# Patient Record
Sex: FEMALE | Race: WHITE | Employment: UNEMPLOYED | URBAN - METROPOLITAN AREA
[De-identification: names, ages, dates, MRNs, and addresses within clinical notes are randomized per-mention and may not be internally consistent; named-entity substitution may affect disease eponyms.]

---

## 2017-05-29 ASSESSMENT — ENCOUNTER SYMPTOMS
SINUS CONGESTION: 0
DOUBLE VISION: 0
HEMATURIA: 0
SINUS PAIN: 0
CHILLS: 0
EYE REDNESS: 0
TROUBLE SWALLOWING: 0
SORE THROAT: 0
NIGHT SWEATS: 1
FATIGUE: 1
POLYDIPSIA: 0
HOT FLASHES: 1
JAUNDICE: 0
DIARRHEA: 1
CONSTIPATION: 0
NECK MASS: 0
HOARSE VOICE: 0
INCREASED ENERGY: 1
DECREASED APPETITE: 0
ABDOMINAL PAIN: 0
VOMITING: 0
RECTAL PAIN: 0
RECTAL BLEEDING: 0
WEIGHT GAIN: 1
HEARTBURN: 0
ALTERED TEMPERATURE REGULATION: 0
DIFFICULTY URINATING: 0
BLOOD IN STOOL: 0
HALLUCINATIONS: 0
FLANK PAIN: 0
EYE PAIN: 0
NAUSEA: 0
POLYPHAGIA: 0
TASTE DISTURBANCE: 0
EYE IRRITATION: 0
FEVER: 0
EYE WATERING: 0
DYSURIA: 0
DECREASED LIBIDO: 1
BOWEL INCONTINENCE: 0
BLOATING: 1
SMELL DISTURBANCE: 0
WEIGHT LOSS: 0

## 2017-05-29 ASSESSMENT — ANXIETY QUESTIONNAIRES
3. WORRYING TOO MUCH ABOUT DIFFERENT THINGS: NOT AT ALL
2. NOT BEING ABLE TO STOP OR CONTROL WORRYING: NOT AT ALL
GAD7 TOTAL SCORE: 0
GAD7 TOTAL SCORE: 0
7. FEELING AFRAID AS IF SOMETHING AWFUL MIGHT HAPPEN: NOT AT ALL
6. BECOMING EASILY ANNOYED OR IRRITABLE: SEVERAL DAYS
1. FEELING NERVOUS, ANXIOUS, OR ON EDGE: NOT AT ALL
4. TROUBLE RELAXING: NOT AT ALL
GAD7 TOTAL SCORE: 1
5. BEING SO RESTLESS THAT IT IS HARD TO SIT STILL: NOT AT ALL
7. FEELING AFRAID AS IF SOMETHING AWFUL MIGHT HAPPEN: NOT AT ALL

## 2017-06-07 ENCOUNTER — OFFICE VISIT (OUTPATIENT)
Dept: OBGYN | Facility: CLINIC | Age: 52
End: 2017-06-07
Attending: OBSTETRICS & GYNECOLOGY
Payer: COMMERCIAL

## 2017-06-07 VITALS
SYSTOLIC BLOOD PRESSURE: 142 MMHG | DIASTOLIC BLOOD PRESSURE: 89 MMHG | HEART RATE: 88 BPM | WEIGHT: 207.3 LBS | HEIGHT: 66 IN | BODY MASS INDEX: 33.32 KG/M2

## 2017-06-07 DIAGNOSIS — N92.0 EXCESSIVE OR FREQUENT MENSTRUATION: ICD-10-CM

## 2017-06-07 DIAGNOSIS — Z01.411 ENCOUNTER FOR GYNECOLOGICAL EXAMINATION WITH ABNORMAL FINDING: Primary | ICD-10-CM

## 2017-06-07 DIAGNOSIS — N84.1 CERVICAL POLYP: ICD-10-CM

## 2017-06-07 DIAGNOSIS — Z12.4 SCREENING FOR MALIGNANT NEOPLASM OF CERVIX: ICD-10-CM

## 2017-06-07 PROCEDURE — 99212 OFFICE O/P EST SF 10 MIN: CPT | Mod: 25,ZF

## 2017-06-07 PROCEDURE — 87624 HPV HI-RISK TYP POOLED RSLT: CPT | Performed by: OBSTETRICS & GYNECOLOGY

## 2017-06-07 PROCEDURE — 88305 TISSUE EXAM BY PATHOLOGIST: CPT | Performed by: OBSTETRICS & GYNECOLOGY

## 2017-06-07 PROCEDURE — G0145 SCR C/V CYTO,THINLAYER,RESCR: HCPCS | Performed by: OBSTETRICS & GYNECOLOGY

## 2017-06-07 RX ORDER — LOSARTAN POTASSIUM 100 MG/1
100 TABLET ORAL
COMMUNITY
Start: 2016-06-09

## 2017-06-07 NOTE — LETTER
2017     RE: Alicia Machuca  [address]  [facility] [address]     Dear Colleague,    Thank you for referring your patient, Alicia Machuca, to the [facility] at [facility]. Please see a copy of my visit note below.  Progress Note    SUBJECTIVE:  Alicia Machuca is an 51 year old  , who requests a breast and pelvic exam.    Patient is followed by Dr. Thomas for primary care.    Concerns today include: Continued heavy menstrual bleeding. Has been stable for past 1-2 years, but heavy bleeding, first day changing pads every 2 hours with clots. Lasts 4 days in total. Previously had abnormal endometrial cells on pap and had D&C which was benign. Does have some perimenopausal symptoms which are maneagable. Does feel that weight has become more of an issue. Decreased exercise and has 8lb weight gain. Occasional stress urinary incontinence, worse with illness. Sexually active without issues.     Menstrual History:  Menstrual History 2011 10/3/2014 10/21/2014 2017 2017   LAST MENSTRUAL PERIOD 2011 2014 10/16/2014 2017 -   Menarche age - - - - 11   Period Cycle (Days) - - - - Q month   Period Duration (Days) - - - - varies 2-5 days   Menstrual Flow - - - - Moderate   Dysmenorrhea - - - - Mild   PMS Symptoms - - - - Headache   Reviewed Today - - - - Yes       Last    Lab Results   Component Value Date    PAP ATYP 10/03/2014       Mammogram current: Due this summer, patient will schedule.     HISTORY:  Prescription Medications as of 2017             losartan (COZAAR) 100 MG tablet Take 100 mg by mouth    Multiple Vitamins-Minerals (MULTIPLE VITAMINS/WOMENS PO) Take 1 tablet by mouth daily    ibuprofen (ADVIL,MOTRIN) 600 MG tablet Take 1 tablet (600 mg) by mouth every 6 hours as needed for pain (mild)        Allergies   Allergen Reactions     Perfume        There is no immunization history on file for this  "patient.    Obstetric History       T2      L2     SAB0   TAB0   Ectopic0   Multiple0   Live Births0      Past Medical History:   Diagnosis Date     Hypertension      Knee pain, bilateral     no surgery, PT, exercises     Lipidemia     Hx elevated in past,   nl FLP,      Past Surgical History:   Procedure Laterality Date     DILATION AND CURETTAGE N/A 2014    Procedure: DILATION AND CURETTAGE;  Surgeon: Anusha Bolden MD;  Location: [facility]     Family History   Problem Relation Age of Onset     Hypertension Mother      OSTEOPOROSIS Mother      Asthma Mother      Hypertension Father      DIABETES Father      Obesity Father      Neurologic Disorder Son      dx aspergers     Asthma Brother      Social History     Social History     Marital status:      Spouse name: N/A     Number of children: N/A     Years of education: N/A     Social History Main Topics     Smoking status: Never Smoker     Smokeless tobacco: Never Used     Alcohol use 2.0 oz/week      Comment: 4-5 per week     Drug use: No     Sexual activity: Yes     Partners: Male     Birth control/ protection: Condom       ROS    EXAM:  Blood pressure 142/89, pulse 88, height 1.676 m (5' 6\"), weight 94 kg (207 lb 4.8 oz), last menstrual period 2017, not currently breastfeeding. Body mass index is 33.46 kg/(m^2).  General appearance: Pleasant female in no acute distress.     BREAST EXAM:  Breast: Without visible skin changes. No dimpling or lesions seen.   Breasts supple, non-tender with palpation, no dominant mass, nodularity, or nipple discharge noted bilaterally. Axillary nodes negative.      PELVIC EXAM:  EG/BUS: Normal genital architecture without lesions, erythema or abnormal secretions Bartholin's, Urethra, Lex[place]'s normal   Urethral meatus: normal   Urethra: no masses, tenderness, or scarring   Bladder: no masses or tenderness   Vagina: moist, pink, rugae with creamy, white and odorless  secretions  Cervix: no " lesions  Uterus: anteverted,   Adnexa: Within normal limits and No masses, nodularity, tenderness  Rectum:anus normal       ASSESSMENT:  Encounter Diagnosis   Name Primary?     Excessive or frequent menstruation Yes      51 year old Female Pelvic and Breast Exam  Menorrhagia    PLAN:   Orders Placed This Encounter   Procedures     US GYN Complete Transvaginal - 32283 (In Clinic)       Will follow up after US for possible EMB  Verbalized understanding and agreement with visit plan.    Anusha Bolden MD         Answers for HPI/ROS submitted by the patient on 5/29/2017   PHQ-2 Score: 0  FAUSTINA 7 TOTAL SCORE: 0  General Symptoms: Yes  Skin Symptoms: No  HENT Symptoms: Yes  EYE SYMPTOMS: Yes  HEART SYMPTOMS: No  LUNG SYMPTOMS: No  INTESTINAL SYMPTOMS: Yes  URINARY SYMPTOMS: Yes  GYNECOLOGIC SYMPTOMS: Yes  BREAST SYMPTOMS: No  SKELETAL SYMPTOMS: No  BLOOD SYMPTOMS: No  NERVOUS SYSTEM SYMPTOMS: No  MENTAL HEALTH SYMPTOMS: No  Fever: No  Loss of appetite: No  Weight loss: No  Weight gain: Yes  Fatigue: Yes  Night sweats: Yes  Chills: No  Increased stress: No  Excessive hunger: No  Excessive thirst: No  Feeling hot or cold when others believe the temperature is normal: No  Loss of height: No  Post-operative complications: No  Surgical site pain: No  Hallucinations: No  Change in or Loss of Energy: Yes  Hyperactivity: No  Confusion: No  Ear pain: No  Ear discharge: No  Hearing loss: No  Tinnitus: No  Nosebleeds: No  Congestion: No  Sinus pain: No  Trouble swallowing: No   Voice hoarseness: No  Mouth sores: No  Sore throat: No  Tooth pain: No  Gum tenderness: No  Bleeding gums: No  Change in taste: No  Change in sense of smell: No  Dry mouth: Yes  Hearing aid used: No  Neck lump: No  Eye pain: No  Vision loss: Yes  Dry eyes: Yes  Watery eyes: No  Eye bulging: No  Double vision: No  Flashing of lights: No  Spots: No  Floaters: No  Redness: No  Crossed eyes: No  Tunnel Vision: No  Yellowing of eyes: No  Eye irritation:  No  Heart burn or indigestion: No  Nausea: No  Vomiting: No  Abdominal pain: No  Bloating: Yes  Constipation: No  Diarrhea: Yes  Blood in stool: No  Black stools: No  Rectal or Anal pain: No  Fecal incontinence: No  Rectal bleeding: No  Yellowing of skin or eyes: No  Vomit with blood: No  Change in stools: No  Hemorrhoids: No  Trouble holding urine or incontinence: No  Pain or burning: No  Trouble starting or stopping: No  Increased frequency of urination: Yes  Blood in urine: No  Decreased frequency of urination: No  Frequent nighttime urination: Yes  Flank pain: No  Difficulty emptying bladder: No  Bleeding or spotting between periods: Yes  Heavy or painful periods: Yes  Irregular periods: Yes  Vaginal discharge: No  Hot flashes: Yes  Vaginal dryness: No  Genital ulcers: No  Reduced libido: Yes  Painful intercourse: No  Difficulty with sexual arousal: No  Post-menopausal bleeding: No    Anusha Bolden MD

## 2017-06-07 NOTE — MR AVS SNAPSHOT
After Visit Summary   6/7/2017    Alicia Machuca    MRN: [id number]           Patient Information     Date Of Birth          1965        Visit Information        Provider Department      6/7/2017 12:45 PM Anusha Bolden MD [facility]        Today's Diagnoses     Excessive or frequent menstruation    -  1      Care Instructions    Schedule ultrasound within 2 weeks of end of period.   Will consider biopsy of uterus following ultrasound.     Check with internist about thyroid testing.     Mammogram this summer.     Follow up for breast/pelvic exam yearly. Pap likely due in 5 years.               Follow-ups after your visit        Future tests that were ordered for you today     Open Future Orders        Priority Expected Expires Ordered    US GYN Complete Transvaginal - 64239 (In Clinic) Routine  10/5/2017 6/7/2017            Who to contact     Please call your clinic at [phone] to:    Ask questions about your health    Make or cancel appointments    Discuss your medicines    Learn about your test results    Speak to your doctor   If you have compliments or concerns about an experience at your clinic, or if you wish to file a complaint, please contact [facility] Patient Relations at [phone] or email us at [email or web address]         Additional Information About Your Visit        MyChart Information     [employer] gives you secure access to your electronic health record. If you see a primary care provider, you can also send messages to your care team and make appointments. If you have questions, please call your primary care clinic.  If you do not have a primary care provider, please call [phone] and they will assist you.      TermScout is an electronic gateway that provides easy, online access to your medical records. With TermScout, you can request a clinic appointment, read your test results, renew a prescription or  "communicate with your care team.     To access your existing account, please contact your [facility] or call [phone] for assistance.        Care EveryWhere ID     This is your Care EveryWhere ID. This could be used by other organizations to access your [place] medical records  [id number]        Your Vitals Were     Pulse Height Last Period Breastfeeding? BMI (Body Mass Index)       88 1.676 m (5' 6\") 05/17/2017 (Approximate) No 33.46 kg/m2        Blood Pressure from Last 3 Encounters:   06/07/17 142/89   11/25/14 134/86   11/12/14 130/79    Weight from Last 3 Encounters:   06/07/17 94 kg (207 lb 4.8 oz)   11/25/14 92.2 kg (203 lb 3.2 oz)   11/12/14 92.1 kg (203 lb 0.7 oz)               Primary Care Provider Office Phone # Fax #    Dominga Thomas [phone]       [facility] [address]  [facility] [address]        Thank you!     Thank you for choosing [facility]  for your care. Our goal is always to provide you with excellent care. Hearing back from our patients is one way we can continue to improve our services. Please take a few minutes to complete the written survey that you may receive in the mail after your visit with us. Thank you!             Your Updated Medication List - Protect others around you: Learn how to safely use, store and throw away your medicines at www.disposemymeds.org.          This list is accurate as of: 6/7/17  1:21 PM.  Always use your most recent med list.                   Brand Name Dispense Instructions for use    ibuprofen 600 MG tablet    ADVIL/MOTRIN    30 tablet    Take 1 tablet (600 mg) by mouth every 6 hours as needed for pain (mild)       losartan 100 MG tablet    COZAAR     Take 100 mg by mouth       MULTIPLE VITAMINS/WOMENS PO      Take 1 tablet by mouth daily         "

## 2017-06-07 NOTE — PROGRESS NOTES
Progress Note    SUBJECTIVE:  Alicia Machuca is an 51 year old  , who requests a breast and pelvic exam.    Patient is followed by Dr. Thomas for primary care.    Concerns today include: Continued heavy menstrual bleeding. Has been stable for past 1-2 years, but heavy bleeding, first day changing pads every 2 hours with clots. Lasts 4 days in total. Previously had abnormal endometrial cells on pap and had D&C which was benign. Does have some perimenopausal symptoms which are maneagable. Does feel that weight has become more of an issue. Decreased exercise and has 8lb weight gain. Occasional stress urinary incontinence, worse with illness. Sexually active without issues.     Menstrual History:  Menstrual History 2011 10/3/2014 10/21/2014 2017 2017   LAST MENSTRUAL PERIOD 2011 2014 10/16/2014 2017 -   Menarche age - - - - 11   Period Cycle (Days) - - - - Q month   Period Duration (Days) - - - - varies 2-5 days   Menstrual Flow - - - - Moderate   Dysmenorrhea - - - - Mild   PMS Symptoms - - - - Headache   Reviewed Today - - - - Yes       Last    Lab Results   Component Value Date    PAP ATYP 10/03/2014       Mammogram current: Due this summer, patient will schedule.     HISTORY:  Prescription Medications as of 2017             losartan (COZAAR) 100 MG tablet Take 100 mg by mouth    Multiple Vitamins-Minerals (MULTIPLE VITAMINS/WOMENS PO) Take 1 tablet by mouth daily    ibuprofen (ADVIL,MOTRIN) 600 MG tablet Take 1 tablet (600 mg) by mouth every 6 hours as needed for pain (mild)        Allergies   Allergen Reactions     Perfume        There is no immunization history on file for this patient.    Obstetric History       T2      L2     SAB0   TAB0   Ectopic0   Multiple0   Live Births0      Past Medical History:   Diagnosis Date     Hypertension      Knee pain, bilateral     no surgery, PT, exercises     Lipidemia     Hx elevated in past,   nl FLP,      Past  "Surgical History:   Procedure Laterality Date     DILATION AND CURETTAGE N/A 11/12/2014    Procedure: DILATION AND CURETTAGE;  Surgeon: Anusha Bolden MD;  Location: [facility]     Family History   Problem Relation Age of Onset     Hypertension Mother      OSTEOPOROSIS Mother      Asthma Mother      Hypertension Father      DIABETES Father      Obesity Father      Neurologic Disorder Son      dx aspergers     Asthma Brother      Social History     Social History     Marital status:      Spouse name: N/A     Number of children: N/A     Years of education: N/A     Social History Main Topics     Smoking status: Never Smoker     Smokeless tobacco: Never Used     Alcohol use 2.0 oz/week      Comment: 4-5 per week     Drug use: No     Sexual activity: Yes     Partners: Male     Birth control/ protection: Condom       ROS    EXAM:  Blood pressure 142/89, pulse 88, height 1.676 m (5' 6\"), weight 94 kg (207 lb 4.8 oz), last menstrual period 05/17/2017, not currently breastfeeding. Body mass index is 33.46 kg/(m^2).  General appearance: Pleasant female in no acute distress.     BREAST EXAM:  Breast: Without visible skin changes. No dimpling or lesions seen.   Breasts supple, non-tender with palpation, no dominant mass, nodularity, or nipple discharge noted bilaterally. Axillary nodes negative.      PELVIC EXAM:  EG/BUS: Normal genital architecture without lesions, erythema or abnormal secretions Bartholin's, Urethra, [place]'s normal   Urethral meatus: normal   Urethra: no masses, tenderness, or scarring   Bladder: no masses or tenderness   Vagina: moist, pink, rugae with creamy, white and odorless  secretions  Cervix: no lesions  Uterus: anteverted,   Adnexa: Within normal limits and No masses, nodularity, tenderness  Rectum:anus normal       ASSESSMENT:  Encounter Diagnosis   Name Primary?     Excessive or frequent menstruation Yes      51 year old Female Pelvic and Breast Exam  Menorrhagia    PLAN:   Orders Placed " This Encounter   Procedures     US GYN Complete Transvaginal - 51408 (In Clinic)       Will follow up after US for possible EMB  Verbalized understanding and agreement with visit plan.    Anusha Bolden MD         Answers for HPI/ROS submitted by the patient on 5/29/2017   PHQ-2 Score: 0  FAUSTINA 7 TOTAL SCORE: 0  General Symptoms: Yes  Skin Symptoms: No  HENT Symptoms: Yes  EYE SYMPTOMS: Yes  HEART SYMPTOMS: No  LUNG SYMPTOMS: No  INTESTINAL SYMPTOMS: Yes  URINARY SYMPTOMS: Yes  GYNECOLOGIC SYMPTOMS: Yes  BREAST SYMPTOMS: No  SKELETAL SYMPTOMS: No  BLOOD SYMPTOMS: No  NERVOUS SYSTEM SYMPTOMS: No  MENTAL HEALTH SYMPTOMS: No  Fever: No  Loss of appetite: No  Weight loss: No  Weight gain: Yes  Fatigue: Yes  Night sweats: Yes  Chills: No  Increased stress: No  Excessive hunger: No  Excessive thirst: No  Feeling hot or cold when others believe the temperature is normal: No  Loss of height: No  Post-operative complications: No  Surgical site pain: No  Hallucinations: No  Change in or Loss of Energy: Yes  Hyperactivity: No  Confusion: No  Ear pain: No  Ear discharge: No  Hearing loss: No  Tinnitus: No  Nosebleeds: No  Congestion: No  Sinus pain: No  Trouble swallowing: No   Voice hoarseness: No  Mouth sores: No  Sore throat: No  Tooth pain: No  Gum tenderness: No  Bleeding gums: No  Change in taste: No  Change in sense of smell: No  Dry mouth: Yes  Hearing aid used: No  Neck lump: No  Eye pain: No  Vision loss: Yes  Dry eyes: Yes  Watery eyes: No  Eye bulging: No  Double vision: No  Flashing of lights: No  Spots: No  Floaters: No  Redness: No  Crossed eyes: No  Tunnel Vision: No  Yellowing of eyes: No  Eye irritation: No  Heart burn or indigestion: No  Nausea: No  Vomiting: No  Abdominal pain: No  Bloating: Yes  Constipation: No  Diarrhea: Yes  Blood in stool: No  Black stools: No  Rectal or Anal pain: No  Fecal incontinence: No  Rectal bleeding: No  Yellowing of skin or eyes: No  Vomit with blood: No  Change in  stools: No  Hemorrhoids: No  Trouble holding urine or incontinence: No  Pain or burning: No  Trouble starting or stopping: No  Increased frequency of urination: Yes  Blood in urine: No  Decreased frequency of urination: No  Frequent nighttime urination: Yes  Flank pain: No  Difficulty emptying bladder: No  Bleeding or spotting between periods: Yes  Heavy or painful periods: Yes  Irregular periods: Yes  Vaginal discharge: No  Hot flashes: Yes  Vaginal dryness: No  Genital ulcers: No  Reduced libido: Yes  Painful intercourse: No  Difficulty with sexual arousal: No  Post-menopausal bleeding: No

## 2017-06-11 LAB — COPATH REPORT: NORMAL

## 2017-06-12 LAB
COPATH REPORT: NORMAL
PAP: NORMAL

## 2017-06-13 LAB
FINAL DIAGNOSIS: NORMAL
HPV HR 12 DNA CVX QL NAA+PROBE: NEGATIVE
HPV16 DNA SPEC QL NAA+PROBE: NEGATIVE
HPV18 DNA SPEC QL NAA+PROBE: NEGATIVE
SPECIMEN DESCRIPTION: NORMAL

## 2017-07-25 ENCOUNTER — OFFICE VISIT (OUTPATIENT)
Dept: OBGYN | Facility: CLINIC | Age: 52
End: 2017-07-25
Payer: COMMERCIAL

## 2017-07-25 DIAGNOSIS — N92.0 EXCESSIVE OR FREQUENT MENSTRUATION: ICD-10-CM

## 2017-07-25 PROCEDURE — 76830 TRANSVAGINAL US NON-OB: CPT | Mod: ZF

## 2017-07-25 NOTE — MR AVS SNAPSHOT
After Visit Summary   7/25/2017    Alicia Machuca    MRN: [id number]           Patient Information     Date Of Birth          1965        Visit Information        Provider Department      7/25/2017 10:30 AM [facility] ULTRASOUND II [facility]        Today's Diagnoses     Excessive or frequent menstruation           Follow-ups after your visit        Who to contact     Please call your clinic at [phone] to:    Ask questions about your health    Make or cancel appointments    Discuss your medicines    Learn about your test results    Speak to your doctor   If you have compliments or concerns about an experience at your clinic, or if you wish to file a complaint, please contact [facility] Patient Relations at [phone] or email us at [email or web address]         Additional Information About Your Visit        MyChart Information     [employer] gives you secure access to your electronic health record. If you see a primary care provider, you can also send messages to your care team and make appointments. If you have questions, please call your primary care clinic.  If you do not have a primary care provider, please call [phone] and they will assist you.      Door 6 is an electronic gateway that provides easy, online access to your medical records. With [employer], you can request a clinic appointment, read your test results, renew a prescription or communicate with your care team.     To access your existing account, please contact your [facility] or call [phone] for assistance.        Care EveryWhere ID     This is your Care EveryWhere ID. This could be used by other organizations to access your [place] medical records  [id number]         Blood Pressure from Last 3 Encounters:   06/07/17 142/89   11/25/14 134/86   11/12/14 130/79    Weight from Last 3 Encounters:   06/07/17 94 kg (207 lb 4.8 oz)    11/25/14 92.2 kg (203 lb 3.2 oz)   11/12/14 92.1 kg (203 lb 0.7 oz)              We Performed the Following     US GYN Complete Transvaginal - 54106 (In Clinic)        Primary Care Provider Office Phone # Fax #    Dominga Thomas [phone]       [facility] [address]  [place] [address]        Equal Access to Services     [facility]: Hadii aad ku hadasho Soomaali, waaxda luqadaha, qaybta kaalmada adeegyada, waxay idiin hayaan adeeg kharash la'aan . So [facility] [phone].    ATENCIÓN: Si rajanila maude, tiene a araujo disposición servicios gratuitos de asistencia lingüística. [facility] [phone].    We comply with applicable federal civil rights laws and [place] laws. We do not discriminate on the basis of race, color, national origin, age, disability sex, sexual orientation or gender identity.            Thank you!     Thank you for choosing [facility]  for your care. Our goal is always to provide you with excellent care. Hearing back from our patients is one way we can continue to improve our services. Please take a few minutes to complete the written survey that you may receive in the mail after your visit with us. Thank you!             Your Updated Medication List - Protect others around you: Learn how to safely use, store and throw away your medicines at www.disposemymeds.org.          This list is accurate as of: 7/25/17  3:42 PM.  Always use your most recent med list.                   Brand Name Dispense Instructions for use Diagnosis    ibuprofen 600 MG tablet    ADVIL/MOTRIN    30 tablet    Take 1 tablet (600 mg) by mouth every 6 hours as needed for pain (mild)    S/P D&C (status post dilation and curettage)       losartan 100 MG tablet    COZAAR     Take 100 mg by mouth        MULTIPLE VITAMINS/WOMENS PO      Take 1 tablet by mouth daily

## 2017-07-25 NOTE — PROGRESS NOTES
52 year old female with LMP 7/15/17 presents for gynecologic ultrasound indicated by menorrhagia.  This study was done transvaginally.    Uterine findings:   Presence: Visible Size: Ehvitw10.1 x 11.2 x 8.8 cm.  Endometrium = 8.2 mm.   Cx length = 61.8 mm.      Flexion:  Anteverted Position: Midline Margins: Abnormal Shape: Abnormal   Contour: Regular Texture: Heterogeneous Cavity: Normal Masses: Abnormal- multiple myomas, the three largest:   1-left uterus 6.8 x 4.8 x 4.5cm   2-Right posterior 4.5 x 4.6 x 4.2cm   3-right posterior/fundal 2.5 x 2.7 x 2.5cm    Pelvic findings:    Right Adnexa: Normal   Left Adnexa: Normal   Bladder:  Normal         Cul - de - sac fluid: None    Ovarian follicles:   Right ovary:  3.6 x 3.0 x 1.5cm.     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  Left ovary not visualized.    MUNIR Agarwal MD

## 2017-07-25 NOTE — LETTER
7/25/2017      RE: Alicia Machuca  [address]  [facility] [address]       52 year old female with LMP 7/15/17 presents for gynecologic ultrasound indicated by menorrhagia.  This study was done transvaginally.    Uterine findings:   Presence: Visible Size: Nifjwo64.1 x 11.2 x 8.8 cm.  Endometrium = 8.2 mm.   Cx length = 61.8 mm.      Flexion:  Anteverted Position: Midline Margins: Abnormal Shape: Abnormal   Contour: Regular Texture: Heterogeneous Cavity: Normal Masses: Abnormal- multiple myomas, the three largest:   1-left uterus 6.8 x 4.8 x 4.5cm   2-Right posterior 4.5 x 4.6 x 4.2cm   3-right posterior/fundal 2.5 x 2.7 x 2.5cm    Pelvic findings:    Right Adnexa: Normal   Left Adnexa: Normal   Bladder:  Normal         Cul - de - sac fluid: None    Ovarian follicles:   Right ovary:  3.6 x 3.0 x 1.5cm.     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  Left ovary not visualized.    MUNIR Agarwal MD    [id number]

## 2018-07-22 NOTE — PATIENT INSTRUCTIONS
Schedule ultrasound within 2 weeks of end of period.   Will consider biopsy of uterus following ultrasound.     Check with internist about thyroid testing.     Mammogram this summer.     Follow up for breast/pelvic exam yearly. Pap likely due in 5 years.       
Normal

## 2018-09-06 ASSESSMENT — ENCOUNTER SYMPTOMS
HOT FLASHES: 0
VOMITING: 0
NAUSEA: 0
JAUNDICE: 0
HEARTBURN: 1
EYE REDNESS: 0
JOINT SWELLING: 1
MYALGIAS: 1
DECREASED LIBIDO: 1
FLANK PAIN: 0
BACK PAIN: 0
HEMATURIA: 0
BLOATING: 1
BOWEL INCONTINENCE: 0
DIFFICULTY URINATING: 0
DOUBLE VISION: 0
DYSURIA: 0
NECK PAIN: 1
BLOOD IN STOOL: 0
ARTHRALGIAS: 1
EYE IRRITATION: 0
ABDOMINAL PAIN: 0
CONSTIPATION: 1
RECTAL PAIN: 0
MUSCLE CRAMPS: 0
EYE PAIN: 0
MUSCLE WEAKNESS: 0
STIFFNESS: 1
EYE WATERING: 0
DIARRHEA: 1

## 2018-09-06 ASSESSMENT — ANXIETY QUESTIONNAIRES
5. BEING SO RESTLESS THAT IT IS HARD TO SIT STILL: NOT AT ALL
1. FEELING NERVOUS, ANXIOUS, OR ON EDGE: NOT AT ALL
GAD7 TOTAL SCORE: 1
2. NOT BEING ABLE TO STOP OR CONTROL WORRYING: NOT AT ALL
4. TROUBLE RELAXING: NOT AT ALL
3. WORRYING TOO MUCH ABOUT DIFFERENT THINGS: NOT AT ALL
6. BECOMING EASILY ANNOYED OR IRRITABLE: SEVERAL DAYS
7. FEELING AFRAID AS IF SOMETHING AWFUL MIGHT HAPPEN: NOT AT ALL
GAD7 TOTAL SCORE: 1
7. FEELING AFRAID AS IF SOMETHING AWFUL MIGHT HAPPEN: NOT AT ALL

## 2018-09-07 ASSESSMENT — ANXIETY QUESTIONNAIRES: GAD7 TOTAL SCORE: 1

## 2018-09-13 ENCOUNTER — OFFICE VISIT (OUTPATIENT)
Dept: OBGYN | Facility: CLINIC | Age: 53
End: 2018-09-13
Attending: OBSTETRICS & GYNECOLOGY
Payer: COMMERCIAL

## 2018-09-13 VITALS
WEIGHT: 210.9 LBS | HEIGHT: 66 IN | BODY MASS INDEX: 33.89 KG/M2 | HEART RATE: 88 BPM | DIASTOLIC BLOOD PRESSURE: 85 MMHG | SYSTOLIC BLOOD PRESSURE: 133 MMHG

## 2018-09-13 DIAGNOSIS — N93.9 ABNORMAL UTERINE BLEEDING (AUB): ICD-10-CM

## 2018-09-13 DIAGNOSIS — Z01.419 ENCOUNTER FOR GYNECOLOGICAL EXAMINATION WITHOUT ABNORMAL FINDING: Primary | ICD-10-CM

## 2018-09-13 PROCEDURE — G0463 HOSPITAL OUTPT CLINIC VISIT: HCPCS | Mod: ZF

## 2018-09-13 RX ORDER — HYDROCHLOROTHIAZIDE 25 MG/1
25 TABLET ORAL
COMMUNITY
Start: 2018-07-12

## 2018-09-13 NOTE — LETTER
"2018       RE: Alicia Machuca  [address]  [facility] [address]     Dear Colleague,    Thank you for referring your patient, Alicia Machuca, to the [facility] at [facility]. Please see a copy of my visit note below.        Progress Note    SUBJECTIVE:  Alicia Machuca is an 53 year old  , who requests a breast and pelvic exam.    Patient is followed by Martha for primary care.    Concerns today include: Perimenopause. Has irregular cycles, last 3 months ago. Prior to that had cycle 6 months ago. Evaluated last year with US when cycles began to change, EMS 8mm, declined EMB. No spotting when has bleeding \"feels like a period.\" Does have some vasomotor symptoms, but overall feels they are manageable. Is sexually active without issues.     Menstrual History:  Menstrual History 2011 10/3/2014 10/21/2014 2017 2017   LAST MENSTRUAL PERIOD 2011 2014 10/16/2014 2017 -   Menarche age - - - - 11   Period Cycle (Days) - - - - Q month   Period Duration (Days) - - - - varies 2-5 days   Menstrual Flow - - - - Moderate   Dysmenorrhea - - - - Mild   PMS Symptoms - - - - Headache   Reviewed Today - - - - Yes       Last    Lab Results   Component Value Date    PAP NIL 2017     History of abnormal Pap smear: NO - age 30-65 PAP every 5 years with negative HPV co-testing recommended    Last   Lab Results   Component Value Date    HPV16 Negative 2017     Last   Lab Results   Component Value Date    HPV18 Negative 2017     Last   Lab Results   Component Value Date    HRHPV Negative 2017       Mammogram current: no (patient will schedule)    HISTORY:  Prescription Medications as of 2018             hydrochlorothiazide (HYDRODIURIL) 25 MG tablet Take 25 mg by mouth    ibuprofen (ADVIL,MOTRIN) 600 MG tablet Take 1 tablet (600 mg) by mouth every 6 hours as needed for pain (mild)    losartan (COZAAR) " "100 MG tablet Take 100 mg by mouth    Multiple Vitamins-Minerals (MULTIPLE VITAMINS/WOMENS PO) Take 1 tablet by mouth daily        Allergies   Allergen Reactions     Perfume        There is no immunization history on file for this patient.    Obstetric History       T2      L2     SAB0   TAB0   Ectopic0   Multiple0   Live Births0      Past Medical History:   Diagnosis Date     Hypertension      Knee pain, bilateral     no surgery, PT, exercises     Lipidemia     Hx elevated in past,   nl FLP,      Past Surgical History:   Procedure Laterality Date     COLONOSCOPY  18    One polyp removed     DILATION AND CURETTAGE N/A 2014    Procedure: DILATION AND CURETTAGE;  Surgeon: Anusha Bolden MD;  Location: [facility]     Family History   Problem Relation Age of Onset     Hypertension Mother      Osteoporosis Mother      Asthma Mother      Hypertension Father      Diabetes Father      Obesity Father      Neurologic Disorder Son      dx aspergers     Asthma Brother      Social History     Social History     Marital status:      Spouse name: N/A     Number of children: N/A     Years of education: N/A     Social History Main Topics     Smoking status: Never Smoker     Smokeless tobacco: Never Used     Alcohol use 2.0 oz/week      Comment: 4-5 per week     Drug use: No     Sexual activity: Yes     Partners: Male     Birth control/ protection: Condom     Other Topics Concern     None     Social History Narrative       EXAM:  Blood pressure 133/85, pulse 88, height 1.676 m (5' 6\"), weight 95.7 kg (210 lb 14.4 oz), not currently breastfeeding. Body mass index is 34.04 kg/(m^2).  General appearance: Pleasant female in no acute distress.     BREAST EXAM:  Breast: Without visible skin changes. No dimpling or lesions seen.   Breasts supple, non-tender with palpation, no dominant mass, nodularity, or nipple discharge noted bilaterally. Axillary nodes negative.      PELVIC EXAM:  EG/BUS: Normal " genital architecture without lesions, erythema or abnormal secretions Bartholin's, Urethra, [place]'s normal   Urethral meatus: normal   Urethra: no masses, tenderness, or scarring   Bladder: no masses or tenderness   Vagina: moist, pink, rugae with creamy, white and odorless  secretions  Cervix: no lesions  Uterus: anteverted,   Adnexa: Within normal limits and No masses, nodularity, tenderness  Rectum:anus normal       ASSESSMENT:  Encounter Diagnoses   Name Primary?     Abnormal uterine bleeding (AUB)      Encounter for gynecological examination without abnormal finding Yes      53 year old Female Pelvic and Breast Exam    PLAN:   Orders Placed This Encounter   Procedures     Pelvic and Breast Exam Procedure []     US Pelvic Complete with Transvaginal   Plan for US with follow up after for EMB If needed    Return in one year/PRN for preventive care or problems/concerns.     Verbalized understanding and agreement with visit plan.    Again, thank you for allowing me to participate in the care of your patient.      Sincerely,    Anusha Bolden MD

## 2018-09-13 NOTE — MR AVS SNAPSHOT
After Visit Summary   9/13/2018    Alicia Machuca    MRN: [id number]           Patient Information     Date Of Birth          1965        Visit Information        Provider Department      9/13/2018 10:00 AM Anusha Bolden MD [facility]        Today's Diagnoses     Encounter for gynecological examination without abnormal finding    -  1    Abnormal uterine bleeding (AUB)           Follow-ups after your visit        Your next 10 appointments already scheduled     Oct 11, 2018  1:30 PM CDT   US PELVIC COMPLETE W TRANSVAGINAL with [id number]   [facility] ([facility])    [facility]  3rd Floor, [address]  [address] [address] [phone]           How do I prepare for my exam? (Food and drink instructions) Adults: Drink four 8-ounce glasses of fluid an hour before your exam. If you need to empty your bladder before your exam, try to release only a little urine. Then, drink another glass of fluid.  Children: * Children who are potty trained up to 6 years old should drink at least 2 cups (16 oz) of water/non-carbonated beverage 30 minutes prior to the exam. * Children who are 6-10 years should drink at least 3 cups (24 oz) of water/non-carbonated beverage 45 minutes prior to the exam. * Children who are 10 years or older should drink at least 4 cups (32 oz) of water/non-carbonated beverage 45 minutes prior to the exam.  If your child is very uncomfortable or has an urgent need to pee, please notify a technologist; they will try to find out how much longer the wait may be and provide instructions to help relieve the pressure.  What should I wear: Wear comfortable clothes.  How long does the exam take: Most ultrasounds take 30 to 60 minutes.  What should I bring: Bring a list of your medicines, including vitamins, minerals and over-the-counter drugs. It is safest to leave personal items at home.   Do I need a :  No  is needed.  What do I need to tell my doctor: Tell your doctor about any allergies you may have.  What should I do after the exam: No restrictions, You may resume normal activities.  What is this test: An ultrasound uses sound waves to make pictures of the body. Sound waves do not cause pain. The only discomfort may be the pressure of the wand against your skin or full bladder.  Who should I call with questions: If you have any questions, please call the Imaging Department where you will have your exam. Directions, parking instructions, and other information is available on our website, [employer].            Oct 11, 2018  2:00 PM CDT   Return Visit with Anusha Bolden MD   [facility] ([facility])    [facility] [address]  [address]  [address]  [facility] [address] [phone]              Who to contact     Please call your clinic at [phone] to:    Ask questions about your health    Make or cancel appointments    Discuss your medicines    Learn about your test results    Speak to your doctor            Additional Information About Your Visit        [id number] Information     [employer] gives you secure access to your electronic health record. If you see a primary care provider, you can also send messages to your care team and make appointments. If you have questions, please call your primary care clinic.  If you do not have a primary care provider, please call [phone] and they will assist you.      [employer] is an electronic gateway that provides easy, online access to your medical records. With [employer], you can request a clinic appointment, read your test results, renew a prescription or communicate with your care team.     To access your existing account, please contact your [facility] or call [phone] for assistance.        Care EveryWhere ID     This is your Care  "EveryWhere ID. This could be used by other organizations to access your [facility] medical records  [id number]        Your Vitals Were     Pulse Height BMI (Body Mass Index)             88 1.676 m (5' 6\") 34.04 kg/m2          Blood Pressure from Last 3 Encounters:   09/13/18 133/85   06/07/17 142/89   11/25/14 134/86    Weight from Last 3 Encounters:   09/13/18 95.7 kg (210 lb 14.4 oz)   06/07/17 94 kg (207 lb 4.8 oz)   11/25/14 92.2 kg (203 lb 3.2 oz)              We Performed the Following     Pelvic and Breast Exam Procedure []        Primary Care Provider Office Phone # Fax #    Dominga VICTOR Swethaaung [phone]       [facility] [address]  [place] [address]        Equal Access to Services     [facility]: Hadii aad ku hadasho Soomaali, waaxda luqadaha, qaybta kaalmada adeegyada, waxay pankajin haykaroln sigifredo bar . So [facility] [phone].    ATENCIÓN: Si habla español, tiene a araujo disposición servicios gratuitos de asistencia lingüística. Vinay al [phone].    We comply with applicable federal civil rights laws and [place] laws. We do not discriminate on the basis of race, color, national origin, age, disability, sex, sexual orientation, or gender identity.            Thank you!     Thank you for choosing [facility]  for your care. Our goal is always to provide you with excellent care. Hearing back from our patients is one way we can continue to improve our services. Please take a few minutes to complete the written survey that you may receive in the mail after your visit with us. Thank you!             Your Updated Medication List - Protect others around you: Learn how to safely use, store and throw away your medicines at www.disposemymeds.org.          This list is accurate as of 9/13/18 11:59 PM.  Always use your most recent med list.                   Brand Name Dispense Instructions for use Diagnosis    hydrochlorothiazide 25 MG tablet    " HYDRODIURIL     Take 25 mg by mouth        ibuprofen 600 MG tablet    ADVIL/MOTRIN    30 tablet    Take 1 tablet (600 mg) by mouth every 6 hours as needed for pain (mild)    S/P D&C (status post dilation and curettage)       losartan 100 MG tablet    COZAAR     Take 100 mg by mouth        MULTIPLE VITAMINS/WOMENS PO      Take 1 tablet by mouth daily

## 2018-09-17 NOTE — PROGRESS NOTES
"    Progress Note    SUBJECTIVE:  Alicia Machuca is an 53 year old  , who requests a breast and pelvic exam.    Patient is followed by Martha for primary care.    Concerns today include: Perimenopause. Has irregular cycles, last 3 months ago. Prior to that had cycle 6 months ago. Evaluated last year with US when cycles began to change, EMS 8mm, declined EMB. No spotting when has bleeding \"feels like a period.\" Does have some vasomotor symptoms, but overall feels they are manageable. Is sexually active without issues.     Menstrual History:  Menstrual History 2011 10/3/2014 10/21/2014 2017 2017   LAST MENSTRUAL PERIOD 2011 2014 10/16/2014 2017 -   Menarche age - - - - 11   Period Cycle (Days) - - - - Q month   Period Duration (Days) - - - - varies 2-5 days   Menstrual Flow - - - - Moderate   Dysmenorrhea - - - - Mild   PMS Symptoms - - - - Headache   Reviewed Today - - - - Yes       Last    Lab Results   Component Value Date    PAP NIL 2017     History of abnormal Pap smear: NO - age 30-65 PAP every 5 years with negative HPV co-testing recommended    Last   Lab Results   Component Value Date    HPV16 Negative 2017     Last   Lab Results   Component Value Date    HPV18 Negative 2017     Last   Lab Results   Component Value Date    HRHPV Negative 2017       Mammogram current: no (patient will schedule)    HISTORY:  Prescription Medications as of 2018             hydrochlorothiazide (HYDRODIURIL) 25 MG tablet Take 25 mg by mouth    ibuprofen (ADVIL,MOTRIN) 600 MG tablet Take 1 tablet (600 mg) by mouth every 6 hours as needed for pain (mild)    losartan (COZAAR) 100 MG tablet Take 100 mg by mouth    Multiple Vitamins-Minerals (MULTIPLE VITAMINS/WOMENS PO) Take 1 tablet by mouth daily        Allergies   Allergen Reactions     Perfume        There is no immunization history on file for this patient.    Obstetric History       T2      L2     " "SAB0   TAB0   Ectopic0   Multiple0   Live Births0      Past Medical History:   Diagnosis Date     Hypertension      Knee pain, bilateral     no surgery, PT, exercises     Lipidemia     Hx elevated in past,  2007 nl FLP,      Past Surgical History:   Procedure Laterality Date     COLONOSCOPY  8/14/18    One polyp removed     DILATION AND CURETTAGE N/A 11/12/2014    Procedure: DILATION AND CURETTAGE;  Surgeon: Anusha Bolden MD;  Location: [facility]     Family History   Problem Relation Age of Onset     Hypertension Mother      Osteoporosis Mother      Asthma Mother      Hypertension Father      Diabetes Father      Obesity Father      Neurologic Disorder Son      dx aspergers     Asthma Brother      Social History     Social History     Marital status:      Spouse name: N/A     Number of children: N/A     Years of education: N/A     Social History Main Topics     Smoking status: Never Smoker     Smokeless tobacco: Never Used     Alcohol use 2.0 oz/week      Comment: 4-5 per week     Drug use: No     Sexual activity: Yes     Partners: Male     Birth control/ protection: Condom     Other Topics Concern     None     Social History Narrative       ROS    EXAM:  Blood pressure 133/85, pulse 88, height 1.676 m (5' 6\"), weight 95.7 kg (210 lb 14.4 oz), not currently breastfeeding. Body mass index is 34.04 kg/(m^2).  General appearance: Pleasant female in no acute distress.     BREAST EXAM:  Breast: Without visible skin changes. No dimpling or lesions seen.   Breasts supple, non-tender with palpation, no dominant mass, nodularity, or nipple discharge noted bilaterally. Axillary nodes negative.      PELVIC EXAM:  EG/BUS: Normal genital architecture without lesions, erythema or abnormal secretions Bartholin's, Urethra, [place]'s normal   Urethral meatus: normal   Urethra: no masses, tenderness, or scarring   Bladder: no masses or tenderness   Vagina: moist, pink, rugae with creamy, white and odorless  " secretions  Cervix: no lesions  Uterus: anteverted,   Adnexa: Within normal limits and No masses, nodularity, tenderness  Rectum:anus normal       ASSESSMENT:  Encounter Diagnoses   Name Primary?     Abnormal uterine bleeding (AUB)      Encounter for gynecological examination without abnormal finding Yes      53 year old Female Pelvic and Breast Exam    PLAN:   Orders Placed This Encounter   Procedures     Pelvic and Breast Exam Procedure []     US Pelvic Complete with Transvaginal   Plan for US with follow up after for EMB If needed    Return in one year/PRN for preventive care or problems/concerns.     Verbalized understanding and agreement with visit plan.    Anusha Bolden MD    Answers for HPI/ROS submitted by the patient on 9/6/2018   FAUSTINA 7 TOTAL SCORE: 1  PHQ-2 Score: 0  General Symptoms: No  Skin Symptoms: No  HENT Symptoms: No  EYE SYMPTOMS: Yes  HEART SYMPTOMS: No  LUNG SYMPTOMS: No  INTESTINAL SYMPTOMS: Yes  URINARY SYMPTOMS: Yes  GYNECOLOGIC SYMPTOMS: Yes  BREAST SYMPTOMS: No  SKELETAL SYMPTOMS: Yes  BLOOD SYMPTOMS: No  NERVOUS SYSTEM SYMPTOMS: No  MENTAL HEALTH SYMPTOMS: No  Eye pain: No  Vision loss: No  Dry eyes: Yes  Watery eyes: No  Eye bulging: No  Double vision: No  Flashing of lights: No  Spots: No  Floaters: Yes  Redness: No  Crossed eyes: No  Tunnel Vision: No  Yellowing of eyes: No  Eye irritation: No  Heart burn or indigestion: Yes  Nausea: No  Vomiting: No  Abdominal pain: No  Bloating: Yes  Constipation: Yes  Diarrhea: Yes  Blood in stool: No  Black stools: No  Rectal or Anal pain: No  Fecal incontinence: No  Yellowing of skin or eyes: No  Vomit with blood: No  Change in stools: No  Trouble holding urine or incontinence: No  Pain or burning: No  Trouble starting or stopping: No  Increased frequency of urination: Yes  Blood in urine: No  Decreased frequency of urination: No  Frequent nighttime urination: Yes  Flank pain: No  Difficulty emptying bladder: No  Back pain: No  Muscle  aches: Yes  Neck pain: Yes  Swollen joints: Yes  Joint pain: Yes  Bone pain: Yes  Muscle cramps: No  Muscle weakness: No  Joint stiffness: Yes  Bone fracture: No  Bleeding or spotting between periods: Yes  Heavy or painful periods: No  Irregular periods: Yes  Vaginal discharge: No  Hot flashes: No  Vaginal dryness: No  Genital ulcers: No  Reduced libido: Yes  Painful intercourse: No  Difficulty with sexual arousal: No  Post-menopausal bleeding: No

## 2018-10-11 ENCOUNTER — OFFICE VISIT (OUTPATIENT)
Dept: OBGYN | Facility: CLINIC | Age: 53
End: 2018-10-11
Attending: OBSTETRICS & GYNECOLOGY
Payer: COMMERCIAL

## 2018-10-11 ENCOUNTER — RADIANT APPOINTMENT (OUTPATIENT)
Dept: ULTRASOUND IMAGING | Facility: CLINIC | Age: 53
End: 2018-10-11
Attending: OBSTETRICS & GYNECOLOGY
Payer: COMMERCIAL

## 2018-10-11 VITALS
HEIGHT: 66 IN | HEART RATE: 87 BPM | SYSTOLIC BLOOD PRESSURE: 145 MMHG | BODY MASS INDEX: 33.82 KG/M2 | WEIGHT: 210.4 LBS | DIASTOLIC BLOOD PRESSURE: 92 MMHG

## 2018-10-11 DIAGNOSIS — R93.89 INCREASED ENDOMETRIAL STRIPE THICKNESS: Primary | ICD-10-CM

## 2018-10-11 DIAGNOSIS — N93.9 ABNORMAL UTERINE BLEEDING: ICD-10-CM

## 2018-10-11 DIAGNOSIS — N93.9 ABNORMAL UTERINE BLEEDING (AUB): ICD-10-CM

## 2018-10-11 PROCEDURE — 88305 TISSUE EXAM BY PATHOLOGIST: CPT | Performed by: STUDENT IN AN ORGANIZED HEALTH CARE EDUCATION/TRAINING PROGRAM

## 2018-10-11 PROCEDURE — 76856 US EXAM PELVIC COMPLETE: CPT

## 2018-10-11 PROCEDURE — 58100 BIOPSY OF UTERUS LINING: CPT | Mod: ZF | Performed by: OBSTETRICS & GYNECOLOGY

## 2018-10-11 PROCEDURE — G0463 HOSPITAL OUTPT CLINIC VISIT: HCPCS | Mod: 25

## 2018-10-11 NOTE — MR AVS SNAPSHOT
"              After Visit Summary   10/11/2018    Alicia Machuca    MRN: [id number]           Patient Information     Date Of Birth          1965        Visit Information        Provider Department      10/11/2018 2:00 PM Anusha Bolden MD [facility]        Today's Diagnoses     Increased endometrial stripe thickness    -  1    Abnormal uterine bleeding           Follow-ups after your visit        Who to contact     Please call your clinic at [phone] to:    Ask questions about your health    Make or cancel appointments    Discuss your medicines    Learn about your test results    Speak to your doctor            Additional Information About Your Visit        MyChart Information     [employer] gives you secure access to your electronic health record. If you see a primary care provider, you can also send messages to your care team and make appointments. If you have questions, please call your primary care clinic.  If you do not have a primary care provider, please call [phone] and they will assist you.      [employer] is an electronic gateway that provides easy, online access to your medical records. With [employer], you can request a clinic appointment, read your test results, renew a prescription or communicate with your care team.     To access your existing account, please contact your [facility] or call [phone] for assistance.        Care EveryWhere ID     This is your Care EveryWhere ID. This could be used by other organizations to access your [place] medical records  [id number]        Your Vitals Were     Pulse Height BMI (Body Mass Index)             87 1.676 m (5' 6\") 33.96 kg/m2          Blood Pressure from Last 3 Encounters:   10/11/18 (!) 145/92   09/13/18 133/85   06/07/17 142/89    Weight from Last 3 Encounters:   10/11/18 95.4 kg (210 lb 6.4 oz)   09/13/18 95.7 kg (210 lb 14.4 oz)   06/07/17 94 kg (207 lb 4.8 oz)              We " Performed the Following     Endometrial biopsy     Surgical pathology exam        Primary Care Provider Office Phone # Fax #    Dominga Thomas [phone]       [facility] [address]  [place] [address]        Equal Access to Services     NALLELY LANDIS : Hadii aad ku hadrigoo Soomaali, waaxda luqadaha, qaybta kaalmada adeegyada, waxay idiin haykaroln ademartin crane dipika . So [facility] [phone].    ATENCIÓN: Si habla español, tiene a araujo disposición servicios gratuitos de asistencia lingüística. Llame al [phone].    We comply with applicable federal civil rights laws and [place] laws. We do not discriminate on the basis of race, color, national origin, age, disability, sex, sexual orientation, or gender identity.            Thank you!     Thank you for choosing [facility]  for your care. Our goal is always to provide you with excellent care. Hearing back from our patients is one way we can continue to improve our services. Please take a few minutes to complete the written survey that you may receive in the mail after your visit with us. Thank you!             Your Updated Medication List - Protect others around you: Learn how to safely use, store and throw away your medicines at www.disposemymeds.org.          This list is accurate as of 10/11/18 11:59 PM.  Always use your most recent med list.                   Brand Name Dispense Instructions for use Diagnosis    hydrochlorothiazide 25 MG tablet    HYDRODIURIL     Take 25 mg by mouth        ibuprofen 600 MG tablet    ADVIL/MOTRIN    30 tablet    Take 1 tablet (600 mg) by mouth every 6 hours as needed for pain (mild)    S/P D&C (status post dilation and curettage)       losartan 100 MG tablet    COZAAR     Take 100 mg by mouth        MULTIPLE VITAMINS/WOMENS PO      Take 1 tablet by mouth daily

## 2018-10-11 NOTE — LETTER
"10/11/2018       RE: Alicia Macuhca  [address]  [facility] [address]     Dear Colleague,    Thank you for referring your patient, Alicia Machuca, to the [facility] at [facility]. Please see a copy of my visit note below.    [facility]   Progress Note    10/11/2018    CC: f/u ultrasound    HPI:  Alicia Machuca is a 53 year old  who presents for follow up of irregular cycles for the past year. Last period was in August and prior to that May. Notes that she gets hot sometimes but is not sure if they are \"hot flashes\" like her friends have. No other concerns today.      Exam:  BP (!) 145/92  Pulse 87  Ht 1.676 m (5' 6\")  Wt 95.4 kg (210 lb 6.4 oz)  BMI 33.96 kg/m2  Gen: NAD, A&Ox3  Pulm: breathing comfortably on room air  MSK: moving all extremities equally  Psych/Neuro: affect appropriate  Pelvic Exam:  Vulva: No external lesions, normal hair distribution, normal architecture  Vagina: Moist, pink, no abnormal discharge, well rugated, no lesions  Cervix: smooth, multiparous, pink, no visible lesions    Imaging:  TVUS:  Uterus: 8.7x10.2x6.7 cm. Flexion: Midposition Endometrium = 10.6 mm.  Multiple myomas - 1.) posterior - appears to be partially in cavity - 3.9 x 3.3 x 3.3cm  2.) posterior left - 6.5 x 5.8 x 3.8cm   3.) posterior - 2.7 x 2.9 x 2.7cm  4.) posterior right - 2.1 x 2.0 x 1.5cm            Endometrial Biopsy    Time Out - \"Pause for the Cause\"  Just before the procedure begins, through verbal and active participation of team members, verify:       Initials   Patient Name KIMMY   Patient  [facility]   Procedure to be performed [facility]     Consent: Verbal consent obtained from patient. Risks, benefits of treatment, and no treatment were discussed.  Patient's questions were elicited and answered. Written consent signed and scanned into medical record and patient received and verbalized understanding of discharge " instructions    Using a medium Graves speculum, the cervix was visualized. The cervix was prepped with Betadine.  A single tooth tenaculum was applied to the anterior lip of the cervix. The endometrial pipelle was advanced through the cervix without difficulty and a sample collected. One additional pass was made.  The tenaculum was removed from the cervix and the tenaculum site made hemostatic with Silver Nitrate sticks .    EBL:  <5cc  Complications:  None immediate    A/P:  Alicia Machuca is a 53 year old  female presenting for follow-up after ultrasound for abnormal uterine bleeding for the past year. Irregular cycles are likely related to perimenopausal state but given thickened endometrial stripe at 10.6mm recommended endometrial biopsy which patient was agreeable to. She tolerated this well.   - EMB Specimen sent for pathology  - Further management recommendations pending pathology results    Patient seen and discussed with Dr. Bolden who was present for the entire procedure.    Vesna Constantino MD  OBGYN Resident PGY1      I was present for above procedure.   Anusha Bolden MD

## 2018-10-11 NOTE — PROGRESS NOTES
"[facility]   Progress Note    10/11/2018    CC: f/u ultrasound    HPI:  Alicia Machuca is a 53 year old  who presents for follow up of irregular cycles for the past year. Last period was in August and prior to that May. Notes that she gets hot sometimes but is not sure if they are \"hot flashes\" like her friends have. No other concerns today.      Exam:  BP (!) 145/92  Pulse 87  Ht 1.676 m (5' 6\")  Wt 95.4 kg (210 lb 6.4 oz)  BMI 33.96 kg/m2  Gen: NAD, A&Ox3  Pulm: breathing comfortably on room air  MSK: moving all extremities equally  Psych/Neuro: affect appropriate  Pelvic Exam:  Vulva: No external lesions, normal hair distribution, normal architecture  Vagina: Moist, pink, no abnormal discharge, well rugated, no lesions  Cervix: smooth, multiparous, pink, no visible lesions    Imaging:  TVUS:  Uterus: 8.7x10.2x6.7 cm. Flexion: Midposition Endometrium = 10.6 mm.  Multiple myomas - 1.) posterior - appears to be partially in cavity - 3.9 x 3.3 x 3.3cm  2.) posterior left - 6.5 x 5.8 x 3.8cm   3.) posterior - 2.7 x 2.9 x 2.7cm  4.) posterior right - 2.1 x 2.0 x 1.5cm            Endometrial Biopsy    Time Out - \"Pause for the Cause\"  Just before the procedure begins, through verbal and active participation of team members, verify:       Initials   Patient Name [facility]   Patient  [facility]   Procedure to be performed [facility]     Consent: Verbal consent obtained from patient. Risks, benefits of treatment, and no treatment were discussed.  Patient's questions were elicited and answered. Written consent signed and scanned into medical record and patient received and verbalized understanding of discharge instructions    Using a medium Graves speculum, the cervix was visualized. The cervix was prepped with Betadine.  A single tooth tenaculum was applied to the anterior lip of the cervix. The endometrial pipelle was advanced through the cervix without difficulty and a sample collected. One " additional pass was made.  The tenaculum was removed from the cervix and the tenaculum site made hemostatic with Silver Nitrate sticks .    EBL:  <5cc  Complications:  None immediate    A/P:  Alicia Machuca is a 53 year old  female presenting for follow-up after ultrasound for abnormal uterine bleeding for the past year. Irregular cycles are likely related to perimenopausal state but given thickened endometrial stripe at 10.6mm recommended endometrial biopsy which patient was agreeable to. She tolerated this well.   - EMB Specimen sent for pathology  - Further management recommendations pending pathology results    Patient seen and discussed with Dr. Bolden who was present for the entire procedure.    Vesna Constantino MD  OBGYN Resident PGY1      I was present for above procedure.   Anusha Bolden MD

## 2018-10-16 LAB — COPATH REPORT: NORMAL

## 2019-10-03 ENCOUNTER — HEALTH MAINTENANCE LETTER (OUTPATIENT)
Age: 54
End: 2019-10-03

## 2021-01-15 ENCOUNTER — HEALTH MAINTENANCE LETTER (OUTPATIENT)
Age: 56
End: 2021-01-15

## 2021-09-05 ENCOUNTER — HEALTH MAINTENANCE LETTER (OUTPATIENT)
Age: 56
End: 2021-09-05

## 2021-10-31 ENCOUNTER — HEALTH MAINTENANCE LETTER (OUTPATIENT)
Age: 56
End: 2021-10-31

## 2022-02-20 ENCOUNTER — HEALTH MAINTENANCE LETTER (OUTPATIENT)
Age: 57
End: 2022-02-20

## 2022-10-23 ENCOUNTER — HEALTH MAINTENANCE LETTER (OUTPATIENT)
Age: 57
End: 2022-10-23

## 2023-04-02 ENCOUNTER — HEALTH MAINTENANCE LETTER (OUTPATIENT)
Age: 58
End: 2023-04-02

## 2023-11-05 ENCOUNTER — HEALTH MAINTENANCE LETTER (OUTPATIENT)
Age: 58
End: 2023-11-05

## 2024-10-22 ASSESSMENT — ANXIETY QUESTIONNAIRES: GAD7 TOTAL SCORE: 0
